# Patient Record
(demographics unavailable — no encounter records)

---

## 2024-11-12 NOTE — HISTORY OF PRESENT ILLNESS
[Former] : former [TextBox_4] : Remote former smoker followed for lung nodules since 2015.  11/28/22: Doing well. No major interval issues, no illnesses or hospitalizations. Reviewed vaccines, UTD.  11/12/2024: In interim she had Keflex for a Main surgery and this was complicated by C diff requiring hospitalization and precipitating a Crohn's flare requiring change from Stelara to Remicade and now under care of GI at [facility]. Had A fib in this time period as well. Fine from lung point of view.  [TextBox_11] : 1.5 [YearQuit] : 1979

## 2024-11-12 NOTE — ASSESSMENT
[FreeTextEntry1] : Data reviewed:  CT chest [facility] 11/2024 personally reviewed : Since 10/16/2019 unchanged nodules incl R apical subsolid nodule  Spirometry 10/23/15: Normal [place] 10/19/17: normal [place] 11/7/18: mild restriction [place] 11/7/19: mild restriction  Impression: Lung nodules in a former smoker (>30py but quit remotely), history of skin cancer  Plan: Again will follow with a repeat scan in TWO years given the subsolid lesion.

## 2025-01-23 NOTE — PHYSICAL EXAM
[Well Developed] : well developed [Well Nourished] : well nourished [No Acute Distress] : no acute distress [Normal Conjunctiva] : normal conjunctiva [Normal Venous Pressure] : normal venous pressure [No Carotid Bruit] : no carotid bruit [Normal S1, S2] : normal S1, S2 [No Murmur] : no murmur [No Rub] : no rub [No Gallop] : no gallop [Clear Lung Fields] : clear lung fields [Good Air Entry] : good air entry [No Respiratory Distress] : no respiratory distress  [Soft] : abdomen soft [Non Tender] : non-tender [No Masses/organomegaly] : no masses/organomegaly [Normal Bowel Sounds] : normal bowel sounds [Normal Gait] : normal gait [No Edema] : no edema [No Cyanosis] : no cyanosis [No Clubbing] : no clubbing [No Varicosities] : no varicosities [No Rash] : no rash [No Skin Lesions] : no skin lesions [Moves all extremities] : moves all extremities [No Focal Deficits] : no focal deficits [Normal Speech] : normal speech [Alert and Oriented] : alert and oriented [Normal memory] : normal memory [Normal Rate] : normal [Rhythm Regular] : regular [Normal S1] : normal S1 [Normal S2] : normal S2 [Normal] : alert and oriented, normal memory

## 2025-01-26 NOTE — CARDIOLOGY SUMMARY
[de-identified] : 8/30/21  nsr 11/11/21  sinus rhythm , low voltage in precordial leads @ 67 bpm 5/5/23 - nsr 11/9/23 -  [de-identified] : 3/03/21\par  LVEF 71%\par  No aortic regurgitations\par  Trace mitral regurgitation\par  No tricuspid regurgitation\par  No pulmonic regurgitation\par   [de-identified] : 10/22/2021\par  No significant interval change in solid and non solid nodules.\par  No routine follow up is required for these nodules.

## 2025-01-26 NOTE — ASSESSMENT
[FreeTextEntry1] : Ms. Johnson is now 77 year old female that returns for a cardiovascular follow up.  She carries a history as outlined below: -Hx of prior cigarette smoking-> s/p 42 years. Quit smoking 1979 -Hx of Hyperlipidemia -Hx of Crohn's disease->  Remicade infusions IV q 4 weeks -Hx of prior C.diff- June -August 2023 -Hx of palpitations -> atrial fibrillation-> s/p JYOTI-> DCCV  #Elevated blood pressure readings without diagnosis of hypertension Today, patient was noted to have elevated blood pressure readings.  BP today: 143/ 86. Reviewed last office visit BP measurement which was also consistently elevated into the 140's  *Requested that patient purchase a home BP cuff and begin monitoring her readings. Requested a one week BP log for review. If measurements remain elevated, will add medication to improve blood pressure control  #Hx of palpitations   Reviewed EKG today-> sinus rhythm without evidence of ectopy   No evidence of afib, no complaints of palpitations   Continuing on Metoprolol Succinate 50 mg QD   Continuing on Eliquis 5 mg BID   *Repeat echocardiogram to reassess cardiac structure and function  #Hyperlipidemia   No recent lipid panel has been drawn  ** Will repeat Lipid panel for review  Repeat full blood work panel: CBC,CMP, HgbA1C, TSH and lipids  - Encouraged patient to participate in healthy walking and eating habits, focusing on a Mediterranean style of eating and aiming for the recommended 150 minutes per week of moderate physical activity.  - Encouraged the patient to find healthy outlets and coping mechanisms to help manage stress, such as physical activity/exercise, reducing workload if possible, spending time with family and friends, engaging in an enjoyable hobby, or using meditation or mindfulness technique  I spent 35 minutes evaluating patient's history, family medical history and blood pressure management, ordering tests and providing documentation.

## 2025-01-26 NOTE — HISTORY OF PRESENT ILLNESS
[FreeTextEntry1] : *Patient returns for follow up- Request for Multaq to be discontinued and is seeking if Eliquis can be dosed @ 2.5 mg bid  76 year old female, former smoker with history of hyperlipidemia, Crohns disease, palpations and known multiple lung nodules, followed by serial CT and pulmonary specialist. Patient reports URENA upon climbing stairs at home.  was recently hostpitalized for afib with RVR underwent a JYOTI with DCCV  She denies SOB, CO, palpation and dizziness. She has been physically active- walking 1-1.5 hours a day. She lives with her . she is on remission for her Crohns. Seen by her PCP Dr Eli CAPUTO 4/13/2023  # Palpitations: IN SR   Prior history pAF s/p JYOTI / CV  off of multaq Continue Metoprolol XL 50 mg  Continue Eliquis 5 mg bid   # HLD :  4/14/2023  TG50 [id number] LDL53 [id number] 11/2022- , TG 61, LDL 81, HDL 95 Encouraged patient to continue healthy exercise and eating habits, focusing on a Mediterranean style of eating and aiming for the recommended 150 minutes per week of moderate physical activity. Atorvastatin 20 mg at bedtime  # Due to Crohns unable to take Asa although patient with history of nonobstructive CAD.  Calcium score 47.  Remains on Statin.  # URENA : Denies    Interval Note Janaury 23, 2025  Ms. Johnson is now 77 year old female that returns for a cardiovascular follow up.  She carries a history as outlined below: -Hx of prior cigarette smoking-> s/p 42 years. Quit smoking 1979 -Hx of Hyperlipidemia -Hx of Crohn's disease->  Remicade infusions IV q 4 weeks -Hx of prior C.diff- June -August 2023 -Hx of palpitations -> atrial fibrillation-> s/p JYOTI-> DCCV  Blood pressure today:  143/ 86 EKG- Sinus rhythm, low voltage in precordial leads @ 69 bpm  Patient reports feeling generally well. However, last November 2024, she had been treated with Keflex prophylactically for a Main procedure for squamous cell carcinoma. It was complicated by C.diff requiring hospitalization and precipitated a Crohn's flare requiring a change from Stelara to Remicade infusions.

## 2025-01-26 NOTE — HISTORY OF PRESENT ILLNESS
[FreeTextEntry1] : *Patient returns for follow up- Request for Multaq to be discontinued and is seeking if Eliquis can be dosed @ 2.5 mg bid  76 year old female, former smoker with history of hyperlipidemia, Crohns disease, palpations and known multiple lung nodules, followed by serial CT and pulmonary specialist. Patient reports URENA upon climbing stairs at home.  was recently hostpitalized for afib with RVR underwent a JYOTI with DCCV  She denies SOB, CO, palpation and dizziness. She has been physically active- walking 1-1.5 hours a day. She lives with her . she is on remission for her Crohns. Seen by her PCP Dr Eli CAPUTO 4/13/2023  # Palpitations: IN SR   Prior history pAF s/p JYOTI / CV  off of multaq Continue Metoprolol XL 50 mg  Continue Eliquis 5 mg bid   # HLD :  4/14/2023  TG50 [id number] LDL53 [id number] 11/2022- , TG 61, LDL 81, HDL 95 Encouraged patient to continue healthy exercise and eating habits, focusing on a Mediterranean style of eating and aiming for the recommended 150 minutes per week of moderate physical activity. Atorvastatin 20 mg at bedtime  # Due to Crohns unable to take Asa although patient with history of nonobstructive CAD.  Calcium score 47.  Remains on Statin.  # URENA : Denies    Interval Note Janaury 23, 2025  Ms. Johnson is now 77 year old female that returns for a cardiovascular follow up.  She carries a history as outlined below: -Hx of prior cigarette smoking-> s/p 42 years. Quit smoking 1979 -Hx of Hyperlipidemia -Hx of Crohn's disease->  Remicade infusions IV q 4 weeks -Hx of prior C.diff- June -August 2023 -Hx of palpitations -> atrial fibrillation-> s/p JYOTI-> DCCV  Blood pressure today:  143/ 86 EKG- Sinus rhythm, low voltage in precordial leads @ 69 bpm  Patient reports feeling generally well. However, last November 2024, she had been treated with Keflex prophylactically for a Main procedure for squamous cell carcinoma. It was complicated by C.diff requiring hospitalization and precipitated a Crohn's flare requiring a change from Stelara to Remicade infusions.

## 2025-01-26 NOTE — CARDIOLOGY SUMMARY
[de-identified] : 8/30/21  nsr 11/11/21  sinus rhythm , low voltage in precordial leads @ 67 bpm 5/5/23 - nsr 11/9/23 -  [de-identified] : 3/03/21\par  LVEF 71%\par  No aortic regurgitations\par  Trace mitral regurgitation\par  No tricuspid regurgitation\par  No pulmonic regurgitation\par   [de-identified] : 10/22/2021\par  No significant interval change in solid and non solid nodules.\par  No routine follow up is required for these nodules.

## 2025-01-26 NOTE — DISCUSSION/SUMMARY
[EKG obtained to assist in diagnosis and management of assessed problem(s)] : EKG obtained to assist in diagnosis and management of assessed problem(s) [FreeTextEntry1] : =

## 2025-03-25 NOTE — DISCUSSION/SUMMARY
[FreeTextEntry1] : Ms. Johnson is now 77 year old female that returns for a cardiovascular follow up.  She carries a history as outlined below: -Hx of prior cigarette smoking-> s/p 42 years. Quit smoking 1979 -Hx of Hyperlipidemia -Hx of Crohn's disease->  Remicade infusions IV q 4 weeks -Hx of prior C.diff- June -August 2023 -Hx of palpitations -> atrial fibrillation-> s/p JYOTI-> DCCV  #Elevated blood pressure readings without diagnosis of hypertension Today, the patient was noted to have elevated blood pressure readings.  BP today: 143/ 86. Reviewed last office visit BP measurement which was also consistently elevated into the 140's has been advised to start amlodipine 2.5 mg daily but she has been holding off BP at home 120-140s/70-80s encouraged to take the amlodipine in light of worsening BPs and other significant cardiac risk factors and persistent weight gain, will start on semaglutide  *Requested that patient purchase a home BP cuff and begin monitoring her readings. Requested a one week BP log for review. If measurements remain elevated, will add medication to improve blood pressure control  #Hx of palpitations   Reviewed EKG today-> sinus rhythm without evidence of ectopy   No evidence of afib, no complaints of palpitations   Continuing on Metoprolol Succinate 50 mg QD   Continuing on Eliquis 5 mg BID   *Repeat echocardiogram to reassess cardiac structure and function  #Hyperlipidemia   No recent lipid panel has been drawn  ** Will repeat Lipid panel for review  Repeat full blood work panel: CBC,CMP, HgbA1C, TSH and lipids  - Encouraged patient to participate in healthy walking and eating habits, focusing on a Mediterranean style of eating and aiming for the recommended 150 minutes per week of moderate physical activity.  - Encouraged the patient to find healthy outlets and coping mechanisms to help manage stress, such as physical activity/exercise, reducing workload if possible, spending time with family and friends, engaging in an enjoyable hobby, or using meditation or mindfulness technique

## 2025-03-25 NOTE — REASON FOR VISIT
[Home] : at home, [unfilled] , at the time of the visit. [Medical Office: ([facility])___] : at the medical office located in  [Telehealth (audio & video)] : This visit was provided via telehealth using real-time 2-way audio visual technology. [Verbal consent obtained from patient] : the patient, [unfilled] [Arrhythmia/ECG Abnorrmalities] : arrhythmia/ECG abnormalities [Hyperlipidemia] : hyperlipidemia [Hypertension] : hypertension

## 2025-03-25 NOTE — PHYSICAL EXAM
[Well Developed] : well developed [Well Nourished] : well nourished [No Acute Distress] : no acute distress [Normal Conjunctiva] : normal conjunctiva [Normal Venous Pressure] : normal venous pressure [No Carotid Bruit] : no carotid bruit [Normal S1, S2] : normal S1, S2 [No Murmur] : no murmur [No Rub] : no rub [No Gallop] : no gallop [Normal Rate] : normal [Rhythm Regular] : regular [Normal S1] : normal S1 [Normal S2] : normal S2 [Clear Lung Fields] : clear lung fields [Good Air Entry] : good air entry [No Respiratory Distress] : no respiratory distress  [Soft] : abdomen soft [Non Tender] : non-tender [No Masses/organomegaly] : no masses/organomegaly [Normal Bowel Sounds] : normal bowel sounds [Normal Gait] : normal gait [No Edema] : no edema [No Cyanosis] : no cyanosis [No Clubbing] : no clubbing [No Varicosities] : no varicosities [No Rash] : no rash [No Skin Lesions] : no skin lesions [Moves all extremities] : moves all extremities [No Focal Deficits] : no focal deficits [Normal Speech] : normal speech [Normal] : alert and oriented, normal memory [Alert and Oriented] : alert and oriented [Normal memory] : normal memory

## 2025-03-25 NOTE — CARDIOLOGY SUMMARY
[de-identified] : 8/30/21  nsr 11/11/21  sinus rhythm , low voltage in precordial leads @ 67 bpm 5/5/23 - nsr 11/9/23 -  [de-identified] : 3/03/21\par  LVEF 71%\par  No aortic regurgitations\par  Trace mitral regurgitation\par  No tricuspid regurgitation\par  No pulmonic regurgitation\par   [de-identified] : 10/22/2021\par  No significant interval change in solid and non solid nodules.\par  No routine follow up is required for these nodules.

## 2025-03-27 NOTE — PLAN
[FreeTextEntry1] : 77 year  old female  presents for annual visit.   -Pap/hpv done today  -Colon UTD -Mammo UTD -Dexa followed by endo   RTO 1yr annual

## 2025-03-27 NOTE — HISTORY OF PRESENT ILLNESS
[FreeTextEntry1] : 77 year  old female  presents for annual visit. Pt is doing well with no complaints.   PMH: Crohn's disease, throid nodules, osteoporosis (Dr. Fritz) PSH: L oophorectomy, removal of breast cysts OBHx:  x 2 Shx: Kelli Hughes's mom, P2 (two daughters)  [Mammogramdate] : 12/2024 [TextBox_19] : Follows w [facility] [BreastSonogramDate] : 3/2023 [PapSmeardate] : 3/2023 [BoneDensityDate] : 10/2023 [TextBox_37] : Followed by Dr. Lam savage [ColonoscopyDate] : 8/2023 [TextBox_43] : h/o Crohn's

## 2025-03-27 NOTE — HISTORY OF PRESENT ILLNESS
[FreeTextEntry1] : 77 year  old female  presents for annual visit. Pt is doing well with no complaints.   PMH: Crohn's disease, throid nodules, osteoporosis (Dr. Fritz) PSH: L oophorectomy, removal of breast cysts OBHx:  x 2 Shx: Kelli Hughes's mom, P2 (two daughters)  [Mammogramdate] : 12/2024 [TextBox_19] : Follows w [facility] [BreastSonogramDate] : 3/2023 [PapSmeardate] : 3/2023 [BoneDensityDate] : 10/2023 [TextBox_37] : Followed by Dr. Lam savage [ColonoscopyDate] : 8/2023 [TextBox_43] : h/o Crohn's

## 2025-03-27 NOTE — END OF VISIT
[FreeTextEntry3] :   I, Ashleighlisbeth Mason, acted as a scribe on behalf of Dr.Susan Cleopatra M.D  on 03/25/2025.   All medical entries made by the scribe were at my,  Dr.Susan Cleopatra M.D ,  direction and personally dictated by me on 03/25/2025. I have reviewed the chart and agree that the record accurately reflects my personal performance of the history, physical exam, assessment and plan. I have also personally directed, reviewed, and agreed with the chart.

## 2025-05-28 NOTE — HISTORY OF PRESENT ILLNESS
[FreeTextEntry1] : *Patient returns for follow up- Request for Multaq to be discontinued and is seeking if Eliquis can be dosed @ 2.5 mg bid  76 year old female, former smoker with history of hyperlipidemia, Crohns disease, palpations and known multiple lung nodules, followed by serial CT and pulmonary specialist. Patient reports URENA upon climbing stairs at home.  was recently hostpitalized for afib with RVR underwent a JYOTI with DCCV  She denies SOB, CO, palpation and dizziness. She has been physically active- walking 1-1.5 hours a day. She lives with her . she is on remission for her Crohns. Seen by her PCP Dr Eli CAPUTO 4/13/2023  # Palpitations: IN SR   Prior history pAF s/p JYOTI / CV  off of multaq Continue Metoprolol XL 50 mg  Continue Eliquis 5 mg bid   # HLD :  4/14/2023  TG50 [id number] LDL53 [id number] 11/2022- , TG 61, LDL 81, HDL 95 Encouraged patient to continue healthy exercise and eating habits, focusing on a Mediterranean style of eating and aiming for the recommended 150 minutes per week of moderate physical activity. Atorvastatin 20 mg at bedtime  # Due to Crohns unable to take Asa although patient with history of nonobstructive CAD.  Calcium score 47.  Remains on Statin.  # URENA : Denies    Interval Note 3/25/25  Ms. Johnson is now 77 year old female that returns for a cardiovascular follow up.  She carries a history as outlined below: -Hx of prior cigarette smoking-> s/p 42 years. Quit smoking 1979 -Hx of Hyperlipidemia -Hx of Crohn's disease->  Remicade infusions IV q 4 weeks -Hx of prior C.diff- June -August 2023 -Hx of palpitations -> atrial fibrillation-> s/p JYOTI-> DCCV  Blood pressure today:  143/ 86 EKG- Sinus rhythm, low voltage in precordial leads @ 69 bpm  Patient reports feeling generally well. However, last November 2024, she had been treated with Keflex prophylactically for a Main procedure for squamous cell carcinoma. It was complicated by C.diff requiring hospitalization and precipitated a Crohn's flare requiring a change from Stelara to Remicade infusions.  
18

## 2025-07-23 NOTE — DISCUSSION/SUMMARY
[EKG obtained to assist in diagnosis and management of assessed problem(s)] : EKG obtained to assist in diagnosis and management of assessed problem(s) [FreeTextEntry1] :  77 year old female, former smoker with history of hyperlipidemia, Crohns disease, palpations and known multiple lung nodules, followed by serial CT and pulmonary specialist. Patient reports URENA upon climbing stairs at home.  was recently hostpitalized for afib with RVR underwent a JYOTI with DCCV  She denies SOB, CO, palpation and dizziness. She has been physically active- walking 1-1.5 hours a day. She lives with her . she is on remission for her Crohns. Seen by her PCP Dr Eli CAPUTO 4/13/2023  # Palpitations: IN SR   Prior history pAF s/p JYOTI / CV  off of multaq Continue Metoprolol XL 50 mg  Continue Eliquis 5 mg bid   # HLD :  1/2025  TG50 [id number] LDL53 [id number] 11/2022- , TG 61, LDL 81, HDL 95 Encouraged patient to continue healthy exercise and eating habits, focusing on a Mediterranean style of eating and aiming for the recommended 150 minutes per week of moderate physical activity. Atorvastatin 20 mg at bedtime  # Due to Crohns unable to take Asa although patient with history of nonobstructive CAD.  Calcium score 47.  Remains on Statin.  # URENA : Denies   Interval Note 7/23/25  Ms. Johnson is now 77 year old female that returns for a cardiovascular follow up.  She carries a history as outlined below: -Hx of prior cigarette smoking-> s/p 42 years. Quit smoking 1979 -Hx of Hyperlipidemia -Hx of Crohn's disease->  Remicade infusions IV q 4 weeks -Hx of prior C.diff- June -August 2023 -Hx of palpitations -> atrial fibrillation-> s/p JYOTI-> DCCV  Blood pressure today:  114/75 - after losing a lot of weight (BP at home 110-118/60) EKG- Sinus rhythm, low voltage in precordial leads @ 69 bpm  Patient reports feeling generally well. However, last November 2024, she had been treated with Keflex prophylactically for a Main procedure for squamous cell carcinoma. It was complicated by C.diff requiring hospitalization and precipitated a Crohn's flare requiring a change from Stelara to Remicade infusions.  #HTN - on the lowside at home will hole the amlodipine Reviewed last office visit BP measurement which was also consistently elevated into the 140's has been advised to start amlodipine 2.5 mg daily but she has been holding off in light of worsening BPs and other significant cardiac risk factors and persistent weight gain, will start on semaglutide  *Requested that patient purchase a home BP cuff and begin monitoring her readings. Requested a one week BP log for review. If measurements remain elevated, will add medication to improve blood pressure control  #Hx of palpitations   Reviewed EKG today-> sinus rhythm without evidence of ectopy   No evidence of afib, no complaints of palpitations   Continuing on Metoprolol Succinate 50 mg QD   Continuing on Eliquis 5 mg BID   *Repeat echocardiogram to reassess cardiac structure and function  #Hyperlipidemia   No recent lipid panel has been drawn  ** Will repeat Lipid panel for review  Repeat full blood work panel: CBC,CMP, HgbA1C, TSH and lipids  - Encouraged patient to participate in healthy walking and eating habits, focusing on a Mediterranean style of eating and aiming for the recommended 150 minutes per week of moderate physical activity.  - Encouraged the patient to find healthy outlets and coping mechanisms to help manage stress, such as physical activity/exercise, reducing workload if possible, spending time with family and friends, engaging in an enjoyable hobby, or using meditation or mindfulness technique

## 2025-07-23 NOTE — CARDIOLOGY SUMMARY
[de-identified] : 8/30/21  nsr 11/11/21  sinus rhythm , low voltage in precordial leads @ 67 bpm 5/5/23 - nsr 11/9/23 -  [de-identified] : 3/03/21\par  LVEF 71%\par  No aortic regurgitations\par  Trace mitral regurgitation\par  No tricuspid regurgitation\par  No pulmonic regurgitation\par   [de-identified] : 10/22/2021\par  No significant interval change in solid and non solid nodules.\par  No routine follow up is required for these nodules.

## 2025-07-23 NOTE — HISTORY OF PRESENT ILLNESS
[FreeTextEntry1] :  77 year old female, former smoker with history of hyperlipidemia, Crohns disease, palpations and known multiple lung nodules, followed by serial CT and pulmonary specialist. Patient reports URENA upon climbing stairs at home.  was recently hostpitalized for afib with RVR underwent a JYOTI with DCCV  She denies SOB, CO, palpation and dizziness. She has been physically active- walking 1-1.5 hours a day. She lives with her . she is on remission for her Crohns. Seen by her PCP Dr Eli CAPUTO 4/13/2023  # Palpitations: IN SR   Prior history pAF s/p JYOTI / CV  off of multaq Continue Metoprolol XL 50 mg  Continue Eliquis 5 mg bid   # HLD :  1/2025  TG50 [id number] LDL53 [id number] 11/2022- , TG 61, LDL 81, HDL 95 Encouraged patient to continue healthy exercise and eating habits, focusing on a Mediterranean style of eating and aiming for the recommended 150 minutes per week of moderate physical activity. Atorvastatin 20 mg at bedtime  # Due to Crohns unable to take Asa although patient with history of nonobstructive CAD.  Calcium score 47.  Remains on Statin.  # URENA : Denies   Interval Note 7/23/25  Ms. Johnson is now 77 year old female that returns for a cardiovascular follow up.  She carries a history as outlined below: -Hx of prior cigarette smoking-> s/p 42 years. Quit smoking 1979 -Hx of Hyperlipidemia -Hx of Crohn's disease->  Remicade infusions IV q 4 weeks -Hx of prior C.diff- June -August 2023 -Hx of palpitations -> atrial fibrillation-> s/p JYOTI-> DCCV  Blood pressure today:  114/75 - after losing a lot of weight (BP at home 110-118/60) EKG- Sinus rhythm, low voltage in precordial leads @ 69 bpm  Patient reports feeling generally well. However, last November 2024, she had been treated with Keflex prophylactically for a Main procedure for squamous cell carcinoma. It was complicated by C.diff requiring hospitalization and precipitated a Crohn's flare requiring a change from Stelara to Remicade infusions.